# Patient Record
Sex: FEMALE | Race: WHITE | URBAN - METROPOLITAN AREA
[De-identification: names, ages, dates, MRNs, and addresses within clinical notes are randomized per-mention and may not be internally consistent; named-entity substitution may affect disease eponyms.]

---

## 2023-03-28 ENCOUNTER — OFFICE VISIT (OUTPATIENT)
Dept: OBGYN CLINIC | Age: 38
End: 2023-03-28
Payer: COMMERCIAL

## 2023-03-28 VITALS
HEIGHT: 65 IN | DIASTOLIC BLOOD PRESSURE: 70 MMHG | SYSTOLIC BLOOD PRESSURE: 118 MMHG | WEIGHT: 161 LBS | BODY MASS INDEX: 26.82 KG/M2

## 2023-03-28 DIAGNOSIS — Z01.419 ENCOUNTER FOR GYNECOLOGICAL EXAMINATION WITHOUT ABNORMAL FINDING: Primary | ICD-10-CM

## 2023-03-28 DIAGNOSIS — Z30.432 ENCOUNTER FOR IUD REMOVAL: ICD-10-CM

## 2023-03-28 PROCEDURE — 99385 PREV VISIT NEW AGE 18-39: CPT | Performed by: OBSTETRICS & GYNECOLOGY

## 2023-03-28 PROCEDURE — 58301 REMOVE INTRAUTERINE DEVICE: CPT | Performed by: OBSTETRICS & GYNECOLOGY

## 2023-03-28 NOTE — PROGRESS NOTES
Annual exam  Chief Complaint   Patient presents with    New Patient     Shantel New" is a 40 y.o. presenting for annual exam. Her main concerns today include removal of IUD. They have a 1year old son, Rosamaria Alexander. They were in [facility] during the pandemic, left to [place] for delivery. She is originally from outside of [facility]. They moved to [place] for her 's job. They are both in [employer]. Shantel works from home. She notes that she had an IUD placed after birth of her son, Rosamaria Alexander. She continues to occasionally feel it and it is bothersome. She is also interested in potential trying for another child. She declines a chaperone during the gynecologic exam today. Ob/Gyn Hx:  - vaginal male in 2020  LMP - IUD  Menses - IUD   Contraception - IUD  STI - declined  SA - yes     Health maintenance:  Pap - 3 years ago- normal per patient in [address] [place] [address] completed in college. History reviewed. No pertinent past medical history. No past surgical history on file. No family history on file.     Social History     Socioeconomic History    Marital status:      Spouse name: Not on file    Number of children: Not on file    Years of education: Not on file    Highest education level: Not on file   Occupational History    Not on file   Tobacco Use    Smoking status: Never    Smokeless tobacco: Never   Vaping Use    Vaping Use: Never used   Substance and Sexual Activity    Alcohol use: Yes    Drug use: Never    Sexual activity: Yes     Partners: Male   Other Topics Concern    Not on file   Social History Narrative    Not on file     Social Determinants of Health     Financial Resource Strain: Not on file   Food Insecurity: Not on file   Transportation Needs: Not on file   Physical Activity: Not on file   Stress: Not on file   Social Connections: Not on file   Intimate Partner Violence: Not on file   Housing Stability: Not on file           Not on File    Review of Systems - History obtained from the patient  Constitutional: negative for weight loss, fever, night sweats  HEENT: negative for hearing loss, earache, congestion, snoring, sorethroat  CV: negative for chest pain, palpitations, edema  Resp: negative for cough, shortness of breath, wheezing  GI: negative for change in bowel habits, abdominal pain, black or bloody stools  : negative for frequency, dysuria, hematuria, vaginal discharge  MSK: negative for back pain, joint pain, muscle pain  Breast: negative for breast lumps, nipple discharge, galactorrhea  Skin :negative for itching, rash, hives  Neuro: negative for dizziness, headache, confusion, weakness  Psych: negative for anxiety, depression, change in mood  Heme/lymph: negative for bleeding, bruising, pallor    Physical Exam    Visit Vitals  /70 (BP 1 Location: Right arm, BP Patient Position: Sitting)   Ht 5' 5\" (1.651 m)   Wt 161 lb (73 kg)   LMP  (LMP Unknown)   BMI 26.79 kg/m²       Constitutional  Appearance: well-nourished, well developed, alert, in no acute distress    HENT  Head and Face: appears normal    Neck  Inspection/Palpation: normal appearance, no masses or tenderness  Lymph Nodes: no lymphadenopathy present  Thyroid: gland size normal, nontender, no nodules or masses present on palpation    Chest  Respiratory Effort: non-labored breathing  Auscultation: CTAB, normal breath sounds    Cardiovascular  Heart:   Auscultation: regular rate and rhythm without murmur  Extremities: no peripheral edema    Breasts   Inspection of Breasts: breasts symmetrical, no skin changes, no discharge present, nipple appearance normal, no skin retraction present  Palpation of Breasts and Axillae: no masses present on palpation, no breast tenderness  Axillary Lymph Nodes: no lymphadenopathy present    Gastrointestinal  Abdominal Examination: abdomen non-tender to palpation, normal bowel sounds, no masses present  Liver and spleen: no hepatomegaly present, spleen not palpable  Hernias: no hernias identified    Genitourinary   External Genitalia: normal appearance for age, no discharge present, no tenderness present, no inflammatory lesions present, no masses present, no atrophy present  Vagina: normal vaginal vault without central or paravaginal defects, no discharge present, no inflammatory lesions present, no masses present  Bladder: non-tender to palpation  Urethra: appears normal  Cervix: normal, +IUD strings visualized  Uterus: normal size, shape and consistency  Adnexa: no adnexal tenderness present, no adnexal masses present  Perineum: perineum within normal limits, no evidence of trauma, no rashes or skin lesions present    Skin  General Inspection: no rash, no lesions identified    Neurologic/Psychiatric  Mental Status:  Orientation: grossly oriented to person, place and time  Mood and Affect: mood normal, affect appropriate      Assessment/Plan:  40 y.o. presenting for annual exam. Overall doing well. Well woman exam:  Normal gynecologic and breast exams. Healthy habits and lifestyle reviewed. Pap with HPV performed today. Patient declines STD screening. Contraception and menstrual regulation - patient opts for none, desires IUD removal and try for another pregnancy. Recommended starting PNV      Shelby Thomas MD      IUD REMOVAL    Chart reviewed for the following:  I have reviewed the History & Physical and updated Shantel Barrington allergies.     TIME OUT performed immediately prior to start of procedure:  I performed the following reviews on Shantel Eloydimple Avilez prior to the start of the procedure:  Patient was identified by name and date of birth   Agreement on procedure being performed was verified  Risks and Benefits explained to the patient  Consent was signed and verified  Time: 09:30  Date of procedure: 3/28/2023  Procedure performed by:  Dr. Ulysses Genta  How tolerated by patient: Well  Post Procedural Pain Scale: 2-Hurts Minimally  Comments: None      Indications for Removal:  Shantel Gwendolyn Rabago is a ,  40 y.o. female WHITE/NON- whose No LMP recorded (lmp unknown). (Menstrual status: IUD). Naman Benoit who presents today for IUD removal. Her current IUD was placed in . She requests removal of the IUD because of discomfort and desire for future childbearing potential. The IUD removal procedure was discussed with the patient and she had no further questions. Procedure: The patient was placed in a dorsal lithotomy position and appropriately draped. On bimanual exam the uterus was anterior and normal in size with no tenderness present. A speculum exam was performed and the cervix was visualized. The cervix was prepped with zephiran solution. The IUD string was visualized. Using ring forceps , the string was grasped and the IUD removed intact. The IUD was shown to the patient.      Kendall Terrazas MD

## 2023-03-31 LAB
CYTOLOGIST CVX/VAG CYTO: NORMAL
CYTOLOGY CVX/VAG DOC CYTO: NORMAL
CYTOLOGY CVX/VAG DOC THIN PREP: NORMAL
DX ICD CODE: NORMAL
HPV GENOTYPE REFLEX: NORMAL
HPV I/H RISK 4 DNA CVX QL PROBE+SIG AMP: NEGATIVE
Lab: NORMAL
OTHER STN SPEC: NORMAL
STAT OF ADQ CVX/VAG CYTO-IMP: NORMAL

## 2023-06-26 ENCOUNTER — INITIAL PRENATAL (OUTPATIENT)
Age: 38
End: 2023-06-26

## 2023-06-26 VITALS — SYSTOLIC BLOOD PRESSURE: 112 MMHG | DIASTOLIC BLOOD PRESSURE: 68 MMHG | BODY MASS INDEX: 26.13 KG/M2 | WEIGHT: 157 LBS

## 2023-06-26 DIAGNOSIS — Z3A.01 6 WEEKS GESTATION OF PREGNANCY: ICD-10-CM

## 2023-06-26 DIAGNOSIS — O09.521 MULTIGRAVIDA OF ADVANCED MATERNAL AGE IN FIRST TRIMESTER: Primary | ICD-10-CM

## 2023-06-28 LAB
BACTERIA UR CULT: NO GROWTH
C TRACH RRNA SPEC QL NAA+PROBE: NEGATIVE
N GONORRHOEA RRNA SPEC QL NAA+PROBE: NEGATIVE
T VAGINALIS RRNA SPEC QL NAA+PROBE: NEGATIVE

## 2023-07-19 ENCOUNTER — ROUTINE PRENATAL (OUTPATIENT)
Age: 38
End: 2023-07-19

## 2023-07-19 VITALS — BODY MASS INDEX: 26.29 KG/M2 | WEIGHT: 158 LBS | DIASTOLIC BLOOD PRESSURE: 64 MMHG | SYSTOLIC BLOOD PRESSURE: 102 MMHG

## 2023-07-19 DIAGNOSIS — Z36.9 UNSPECIFIED ANTENATAL SCREENING: Primary | ICD-10-CM

## 2023-07-19 DIAGNOSIS — Z36.9 UNSPECIFIED ANTENATAL SCREENING: ICD-10-CM

## 2023-07-19 DIAGNOSIS — Z3A.01 6 WEEKS GESTATION OF PREGNANCY: ICD-10-CM

## 2023-07-19 PROCEDURE — 0502F SUBSEQUENT PRENATAL CARE: CPT | Performed by: MIDWIFE

## 2023-07-19 NOTE — PROGRESS NOTES
OB Visit:    Nausea is resolving. Reviewed AMA guidelines. Discussed ASA. She will start at 12 weeks. Discussed diet and water intake  Offered anatomy scan with M. She will likely decline. Labs today. Wants to know sex. CE 4 weeks.

## 2023-07-20 LAB
ABO GROUP BLD: NORMAL
BLD GP AB SCN SERPL QL: NEGATIVE
ERYTHROCYTE [DISTWIDTH] IN BLOOD BY AUTOMATED COUNT: 12.7 % (ref 11.7–15.4)
HCT VFR BLD AUTO: 39.9 % (ref 34–46.6)
HGB BLD-MCNC: 13.7 G/DL (ref 11.1–15.9)
HIV 1+2 AB+HIV1 P24 AG SERPL QL IA: NON REACTIVE
MCH RBC QN AUTO: 32.3 PG (ref 26.6–33)
MCHC RBC AUTO-ENTMCNC: 34.3 G/DL (ref 31.5–35.7)
MCV RBC AUTO: 94 FL (ref 79–97)
PLATELET # BLD AUTO: 284 X10E3/UL (ref 150–450)
RBC # BLD AUTO: 4.24 X10E6/UL (ref 3.77–5.28)
RH BLD: POSITIVE
RUBV IGG SERPL IA-ACNC: 7.06 INDEX
TSH SERPL DL<=0.005 MIU/L-ACNC: 2.06 UIU/ML (ref 0.45–4.5)
VZV IGG SER IA-ACNC: 1438 INDEX
WBC # BLD AUTO: 7.4 X10E3/UL (ref 3.4–10.8)

## 2023-07-21 LAB
HBV SURFACE AG SERPL QL IA: NEGATIVE
HCV IGG SERPL QL IA: NON REACTIVE
TREPONEMA PALLIDUM IGG+IGM AB [PRESENCE] IN SERUM OR PLASMA BY IMMUNOASSAY: NON REACTIVE

## 2023-07-24 LAB
HGB A MFR BLD ELPH: 97.2 % (ref 96.4–98.8)
HGB A2 MFR BLD ELPH: 2.8 % (ref 1.8–3.2)
HGB F MFR BLD ELPH: 0 % (ref 0–2)
HGB FRACT BLD-IMP: NORMAL
HGB S MFR BLD ELPH: 0 %

## 2023-07-28 ENCOUNTER — TELEPHONE (OUTPATIENT)
Age: 38
End: 2023-07-28

## 2023-07-28 ENCOUNTER — CLINICAL DOCUMENTATION (OUTPATIENT)
Age: 38
End: 2023-07-28

## 2023-07-28 DIAGNOSIS — Z36.9 UNSPECIFIED ANTENATAL SCREENING: Primary | ICD-10-CM

## 2023-07-28 DIAGNOSIS — Z3A.01 6 WEEKS GESTATION OF PREGNANCY: ICD-10-CM

## 2023-07-28 LAB
Lab: ABNORMAL
NTRA 1P36 DELETION SYNDROME POPULATION-BASED RISK TEXT: ABNORMAL
NTRA 1P36 DELETION SYNDROME RESULT TEXT: ABNORMAL
NTRA 1P36 DELETION SYNDROME RISK SCORE TEXT: ABNORMAL
NTRA 22Q11.2 DELETION SYNDROME POPULATION-BASED RISK TEXT: ABNORMAL
NTRA 22Q11.2 DELETION SYNDROME RESULT TEXT: ABNORMAL
NTRA 22Q11.2 DELETION SYNDROME RISK SCORE TEXT: ABNORMAL
NTRA ANGELMAN SYNDROME POPULATION-BASED RISK TEXT: ABNORMAL
NTRA ANGELMAN SYNDROME RESULT TEXT: ABNORMAL
NTRA ANGELMAN SYNDROME RISK SCORE TEXT: ABNORMAL
NTRA CRI-DU-CHAT SYNDROME POPULATION-BASED RISK TEXT: ABNORMAL
NTRA CRI-DU-CHAT SYNDROME RESULT TEXT: ABNORMAL
NTRA CRI-DU-CHAT SYNDROME RISK SCORE TEXT: ABNORMAL
NTRA FETAL FRACTION: ABNORMAL
NTRA GENDER OF FETUS: ABNORMAL
NTRA MONOSOMY X AGE-BASED RISK TEXT: ABNORMAL
NTRA MONOSOMY X RESULT TEXT: ABNORMAL
NTRA MONOSOMY X RISK SCORE TEXT: ABNORMAL
NTRA PRADER-WILLI SYNDROME POPULATION-BASED RISK TEXT: ABNORMAL
NTRA PRADER-WILLI SYNDROME RESULT TEXT: ABNORMAL
NTRA PRADER-WILLI SYNDROME RISK SCORE TEXT: ABNORMAL
NTRA TRIPLOIDY RESULT TEXT: ABNORMAL
NTRA TRISOMY 13 AGE-BASED RISK TEXT: ABNORMAL
NTRA TRISOMY 13 RESULT TEXT: ABNORMAL
NTRA TRISOMY 13 RISK SCORE TEXT: ABNORMAL
NTRA TRISOMY 18 AGE-BASED RISK TEXT: ABNORMAL
NTRA TRISOMY 18 RESULT TEXT: ABNORMAL
NTRA TRISOMY 18 RISK SCORE TEXT: ABNORMAL
NTRA TRISOMY 21 AGE-BASED RISK TEXT: ABNORMAL
NTRA TRISOMY 21 RESULT TEXT: ABNORMAL
NTRA TRISOMY 21 RISK SCORE TEXT: ABNORMAL

## 2023-07-28 NOTE — TELEPHONE ENCOUNTER
Pateint calling name and  verified. Patient is  10 weeks 5 days gestation she saw her panorama results and wants to discuss with midwife.

## 2023-07-28 NOTE — PROGRESS NOTES
Pt called. Reviewed [facility] results. Pattern suggestive of Triple X Syndrome. Discussed screening vs diagnostic. Reviewed impact of Triple X on babies, occ issues with learning, developmental disabilities. MFM consult placed to be seen ASAP to discuss diagnostic options and for genetic counseling. Pt and  verbalized understanding.     JUMA Talley CNM

## 2023-08-02 ENCOUNTER — CLINICAL DOCUMENTATION (OUTPATIENT)
Facility: HOSPITAL | Age: 38
End: 2023-08-02

## 2023-08-02 ENCOUNTER — HOSPITAL ENCOUNTER (OUTPATIENT)
Facility: HOSPITAL | Age: 38
Discharge: HOME | End: 2023-08-05
Payer: COMMERCIAL

## 2023-08-02 PROCEDURE — 76813 OB US NUCHAL MEAS 1 GEST: CPT

## 2023-08-02 NOTE — PROGRESS NOTES
intellectual disabilities, multiple miscarriages, stillbirths, known genetic disorders, [place] ancestry, and consanguinity. The patient verbalized her understanding of the information as it was presented to her today; she denies any further questions or concerns at this time. Brian Matos MS, [address] [facility], Certified Genetic Counselor     30 minutes were spent via telemedicine with the patient for genetic evaluation including creating the pedigree, risk assessment, counseling regarding relevant genetic disorders, and explanation of appropriate genetic testing options.

## 2023-08-07 ENCOUNTER — TELEPHONE (OUTPATIENT)
Facility: HOSPITAL | Age: 38
End: 2023-08-07

## 2023-08-07 NOTE — TELEPHONE ENCOUNTER
Pt called, she was concerned that [facility] had not called to get her scheduled for her amnio at 16 weeks due to abnormal NIPT results for XXX- pt would like to have the amnio. I called [facility] and requested that they call her today for an apt. The receptions said she would call to get her scheduled ASAP.     JUMA Bradley CNM

## 2023-08-15 SDOH — ECONOMIC STABILITY: HOUSING INSECURITY
IN THE LAST 12 MONTHS, WAS THERE A TIME WHEN YOU DID NOT HAVE A STEADY PLACE TO SLEEP OR SLEPT IN A SHELTER (INCLUDING NOW)?: NO

## 2023-08-15 SDOH — ECONOMIC STABILITY: FOOD INSECURITY: WITHIN THE PAST 12 MONTHS, YOU WORRIED THAT YOUR FOOD WOULD RUN OUT BEFORE YOU GOT MONEY TO BUY MORE.: NEVER TRUE

## 2023-08-15 SDOH — ECONOMIC STABILITY: TRANSPORTATION INSECURITY
IN THE PAST 12 MONTHS, HAS LACK OF TRANSPORTATION KEPT YOU FROM MEETINGS, WORK, OR FROM GETTING THINGS NEEDED FOR DAILY LIVING?: NO

## 2023-08-15 SDOH — ECONOMIC STABILITY: FOOD INSECURITY: WITHIN THE PAST 12 MONTHS, THE FOOD YOU BOUGHT JUST DIDN'T LAST AND YOU DIDN'T HAVE MONEY TO GET MORE.: NEVER TRUE

## 2023-08-15 SDOH — ECONOMIC STABILITY: INCOME INSECURITY: HOW HARD IS IT FOR YOU TO PAY FOR THE VERY BASICS LIKE FOOD, HOUSING, MEDICAL CARE, AND HEATING?: NOT HARD AT ALL

## 2023-08-16 ENCOUNTER — ROUTINE PRENATAL (OUTPATIENT)
Age: 38
End: 2023-08-16

## 2023-08-16 VITALS — WEIGHT: 157 LBS | DIASTOLIC BLOOD PRESSURE: 68 MMHG | BODY MASS INDEX: 26.13 KG/M2 | SYSTOLIC BLOOD PRESSURE: 104 MMHG

## 2023-08-16 DIAGNOSIS — Z34.82 ENCOUNTER FOR SUPERVISION OF OTHER NORMAL PREGNANCY IN SECOND TRIMESTER: Primary | ICD-10-CM

## 2023-08-16 PROCEDURE — 0502F SUBSEQUENT PRENATAL CARE: CPT | Performed by: MIDWIFE

## 2023-08-16 RX ORDER — AZITHROMYCIN 250 MG/1
250 TABLET, FILM COATED ORAL DAILY
COMMUNITY

## 2023-08-16 NOTE — PROGRESS NOTES
Pt reports trouble sleeping due to not feeling well recently. Notes she has been coughing so hard she's been vomiting. Has started on zpack and is taking robitussin prn. Anxious for amnio on 9/7 (triple X on NIPT). Reviewed 2nd trimester hand out.   CE 4 weeks

## 2023-09-07 ENCOUNTER — ROUTINE PRENATAL (OUTPATIENT)
Age: 38
End: 2023-09-07

## 2023-09-07 DIAGNOSIS — Z3A.16 16 WEEKS GESTATION OF PREGNANCY: Primary | ICD-10-CM

## 2023-09-07 DIAGNOSIS — O28.5 ABNORMAL CHROMOSOMAL AND GENETIC FINDING ON ANTENATAL SCREENING OF MOTHER: ICD-10-CM

## 2023-09-07 DIAGNOSIS — O28.5 ABNORMAL CHROMOSOMAL AND GENETIC FINDING ON ANTENATAL SCREENING MOTHER: ICD-10-CM

## 2023-09-07 DIAGNOSIS — O09.522 MULTIGRAVIDA OF ADVANCED MATERNAL AGE IN SECOND TRIMESTER: ICD-10-CM

## 2023-09-07 NOTE — PROCEDURES
PATIENT: NATALIA ROSARIO   -  : 1985   -  DOS:2023   -  INTERPRETING PROVIDER:Danii Steiner,   Indication  ========    Advanced maternal age  Abnormal genetic screening - XXX    History  ======    General History  Blood group: A  Rhesus: Rh positive  Medical History  Allergies: No allergies identified    Method  ======    Transabdominal ultrasound examination. View: Sufficient    Pregnancy  =========    Shore pregnancy. Number of fetuses: 1    Dating  ======    Previous Ultrasound on: 2023  Type of prior assessment: GA  GA at prior assessment date 6 w + 1 d  GA by previous U/S 16 w + 4 d  JOSUE by previous Ultrasound: 2024  Assigned: based on ultrasound (GA), selected on 2023  Assigned GA 16 w + 4 d  Assigned JOSUE: 2024    General Evaluation  ==============    Cardiac activity present.  bpm. Fetal movements: visualized. Presentation: cephalic  Placenta: Placental site: fundal right posterior  Umbilical cord: Cord vessels: 3 vessel cord  Amniotic fluid: Amount of AF: normal amount    Fetal Anatomy  ===========    Fetal sex: XX    Amniocentesis  ============    Start 10:16 AM. End 10:17 AM  Instrument: TA 20G needle. Entries uterus: 1  Sample: obtained. Sample amount 30 ml  Estimated blood loss:  Pain scale before procedure:  Paint scale after procedure:    Evaluation  ========    Post cardiac activity: present, normal. FHR post 156 bpm    Findings  =======    EFW 69th% AC = 64th%. Limited anatomy appears WNL. Placenta is posterior. Universal protocol was performed prior to the procedure. Informed consent was obtained after the procedure was explained and the patient had an opportunity to ask  questions. Identifying labels and the procedure site were verified. After all supplies/trays were set-up, a Time-Out was then performed. Uncomplicated ultrasound guided amniocentesis was performed with a single needle pass 20 gauge through the uterus.  Approximately 30 ml of straw

## 2023-09-13 ENCOUNTER — ROUTINE PRENATAL (OUTPATIENT)
Age: 38
End: 2023-09-13

## 2023-09-13 VITALS — DIASTOLIC BLOOD PRESSURE: 82 MMHG | WEIGHT: 160.8 LBS | BODY MASS INDEX: 26.76 KG/M2 | SYSTOLIC BLOOD PRESSURE: 118 MMHG

## 2023-09-13 DIAGNOSIS — Z34.82 PRENATAL CARE, SUBSEQUENT PREGNANCY IN SECOND TRIMESTER: Primary | ICD-10-CM

## 2023-09-13 DIAGNOSIS — Z3A.01 6 WEEKS GESTATION OF PREGNANCY: ICD-10-CM

## 2023-09-13 PROCEDURE — 0502F SUBSEQUENT PRENATAL CARE: CPT | Performed by: ADVANCED PRACTICE MIDWIFE

## 2023-09-13 NOTE — PROGRESS NOTES
OB Visit:    Siri Mcbride done 9/7/23, pt and partner very anxious, vomited this AM  No results yet  Rev Triple X and AFP results  Not feeling movement yet  Has FAS scheduled with [facility] on 10/4

## 2023-09-14 ENCOUNTER — TELEPHONE (OUTPATIENT)
Age: 38
End: 2023-09-14

## 2023-09-14 NOTE — TELEPHONE ENCOUNTER
I spoke to the patient, Abhijeet Frost, today over the phone to review her abnormal fluorescent in-situ hybridization (FISH) results from amniocentesis performed 9/7/23. Shandra previously had non-invasive prenatal testing that was abnormal for 47,XXX syndrome. She elected to proceed with diagnostic amniocentesis, which was performed on 9/7/23. At that time FISH with reflex to either karyotype or microarray with amniotic fluid AFP was ordered. Today we reviewed the patient's FISH results. FISH analysis of uncultured amniocytes revealed three X chromosome signals (XXX) in all cells analyzed. Two signals were observed for chromosomes 13, 18 an 21. 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 Based on these abnormal FISH results, the fetal karyotype and maternal cell contamination results are still pending. Additionally, the amniotic fluid alpha fetoprotein (AFP) value was NOT ELEVATED for the gestational age. The patient verbalized her understanding of the information as it was presented to her today; she denies any further questions or concerns at this time.     Yamel Su MS, [facility]  Licensed, Certified Genetic Counselor

## 2023-09-15 ENCOUNTER — TELEPHONE (OUTPATIENT)
Age: 38
End: 2023-09-15

## 2023-09-15 NOTE — TELEPHONE ENCOUNTER
be identified on prenatal ultrasound; however, a fetal anatomic survey will be performed around 20 weeks gestation. The patient and Jeremi Celestin verbalized their understanding of the information as it was presented to them today; they deny any further questions or concerns at this time. Adams Granger MS, [facility]  Licensed, Certified Genetic Counselor    45 minutes were spent via telemedicine with the patient for genetic evaluation including risk assessment, counseling regarding relevant genetic disorders and explanation of appropriate genetic testing options.

## 2023-09-18 ENCOUNTER — OFFICE VISIT (OUTPATIENT)
Age: 38
End: 2023-09-18
Payer: COMMERCIAL

## 2023-09-18 VITALS
SYSTOLIC BLOOD PRESSURE: 107 MMHG | HEIGHT: 65 IN | RESPIRATION RATE: 20 BRPM | TEMPERATURE: 98.4 F | BODY MASS INDEX: 26.02 KG/M2 | DIASTOLIC BLOOD PRESSURE: 66 MMHG | HEART RATE: 95 BPM | WEIGHT: 156.2 LBS | OXYGEN SATURATION: 99 %

## 2023-09-18 DIAGNOSIS — O09.522 MULTIGRAVIDA OF ADVANCED MATERNAL AGE IN SECOND TRIMESTER: ICD-10-CM

## 2023-09-18 DIAGNOSIS — Z76.89 ENCOUNTER TO ESTABLISH CARE: Primary | ICD-10-CM

## 2023-09-18 DIAGNOSIS — J06.9 VIRAL URI WITH COUGH: ICD-10-CM

## 2023-09-18 PROCEDURE — 99203 OFFICE O/P NEW LOW 30 MIN: CPT | Performed by: STUDENT IN AN ORGANIZED HEALTH CARE EDUCATION/TRAINING PROGRAM

## 2023-09-18 SDOH — ECONOMIC STABILITY: FOOD INSECURITY: WITHIN THE PAST 12 MONTHS, YOU WORRIED THAT YOUR FOOD WOULD RUN OUT BEFORE YOU GOT MONEY TO BUY MORE.: NEVER TRUE

## 2023-09-18 SDOH — ECONOMIC STABILITY: INCOME INSECURITY: HOW HARD IS IT FOR YOU TO PAY FOR THE VERY BASICS LIKE FOOD, HOUSING, MEDICAL CARE, AND HEATING?: NOT HARD AT ALL

## 2023-09-18 SDOH — ECONOMIC STABILITY: FOOD INSECURITY: WITHIN THE PAST 12 MONTHS, THE FOOD YOU BOUGHT JUST DIDN'T LAST AND YOU DIDN'T HAVE MONEY TO GET MORE.: NEVER TRUE

## 2023-09-18 ASSESSMENT — PATIENT HEALTH QUESTIONNAIRE - PHQ9
SUM OF ALL RESPONSES TO PHQ QUESTIONS 1-9: 1
1. LITTLE INTEREST OR PLEASURE IN DOING THINGS: 0
SUM OF ALL RESPONSES TO PHQ QUESTIONS 1-9: 1
SUM OF ALL RESPONSES TO PHQ QUESTIONS 1-9: 1
SUM OF ALL RESPONSES TO PHQ9 QUESTIONS 1 & 2: 1
2. FEELING DOWN, DEPRESSED OR HOPELESS: 1
SUM OF ALL RESPONSES TO PHQ QUESTIONS 1-9: 1

## 2023-09-18 NOTE — PROGRESS NOTES
[address] [address] [address] [place], [address] [facility]  [phone]        Establish Care Visit      Subjective     CC: Establish care  HPI: Amrit Shrestha is a 45 y.o. female presenting to the clinic today to establish care. Patient currently 18 weeks pregnant. Established with Ob/gyn    Cough  Started on Friday noticed that she started to cough more, then progressed to a dry sounding cough. No sputum production. Started to cough enough to vomit. Eating some fluids, but appetite has been limited. Urinating normally. Taking robitussin since Saturday night every 4 hours. No fever. Her son is sick right now, but is getting somewhat better. No SOB. This is her second URI. Had the first URI in the first few weeks of pregnancy with productive cough. Was given Azithromycin. Has allergies at baseline. Took Covid test yesterday which was negative          Review of systems:   A comprehensive review of systems was negative except as written in the HPI. History    No Known Allergies    History reviewed. No pertinent past medical history. History reviewed. No pertinent surgical history.      Family History   Problem Relation Age of Onset    Lung Cancer Paternal Aunt     Breast Cancer Paternal Aunt     Breast Cancer Paternal Aunt         Social History     Socioeconomic History    Marital status:      Spouse name: Not on file    Number of children: 1    Years of education: Not on file    Highest education level: Master's degree (e.g., MA, MS, Amelia, MEd, MSW, MARY JO)   Occupational History    Occupation:      Comment: Work from home   Tobacco Use    Smoking status: Never    Smokeless tobacco: Never   Vaping Use    Vaping Use: Never used   Substance and Sexual Activity    Alcohol use: Not Currently    Drug use: Never    Sexual activity: Yes     Partners: Male   Other Topics Concern    Not on file   Social History Narrative    Not on file     Social

## 2023-09-18 NOTE — PATIENT INSTRUCTIONS
For the cough: Continue to take the robitussin every 4 hours, can also add clairitin 1 tablet every 24 hour while symptoms continue. Use the humidifer daily, and saline nasal spray. Honey in tea can help as well.

## 2023-09-21 ENCOUNTER — TELEPHONE (OUTPATIENT)
Age: 38
End: 2023-09-21

## 2023-09-21 DIAGNOSIS — O28.5 ABNORMAL CHROMOSOMAL AND GENETIC FINDING ON ANTENATAL SCREENING MOTHER: Primary | ICD-10-CM

## 2023-09-21 NOTE — TELEPHONE ENCOUNTER
I spoke to the patient, Ronak Fernandez, and her partner, Rachel Sawyer, today over the phone today to review her abnormal fetal karyotype results from amniocentesis performed 9/7/2023. Shandra previously had non-invasive prenatal testing that was abnormal for 47,XXX syndrome. She elected to proceed with diagnostic amniocentesis, which was performed on 9/7/23. At that time FISH with reflex to either karyotype or microarray with amniotic fluid AFP was ordered. After the [facility] result was also consistent with 47,XXX syndrome, a karyotype was performed; we reviewed those results today. Cytogenetics analysis of cultured amniocytes has revealed a female karyotype with an extra X chromosome in all cultured cells examined. These results are consistent with a female fetus with 47,XXX syndrome. We reviewed today that maternal cell contamination [facility]) studies are still pending; however, Katia Kathleen states that her mother had an amniocentesis during her pregnancy with Shandra, and the karyotype at that time was normal. Based on this report, the [facility] results are expected to be negative, or normal.    Per [facility], it may be a week or two before they are able to reach out to the patient and get her scheduled with their [facility]. The patient was made aware of this and instructed to reach out to our office if she has not heard anything by EOD on Wednesday of next week. The patient and her partner verbalized their understanding of the information as it was presented to them today; they deny any further questions or concerns at this time.     Maryuri Montero MS, [facility]  Licensed, Certified Genetic Counselor

## 2023-09-28 LAB
AFP ADJ MOM AMN: 0.75
AFP AMN-MCNC: 10 UG/ML
AFP AMNIOTIC FLUID: NORMAL
AFP INTERP AMN-IMP: NORMAL
CELLS ANALYZED AMN: 15
CELLS ANALYZED AMN: NORMAL
CELLS COUNTED AMN: 15
CELLS COUNTED AMN: NORMAL
CELLS KARYOTYPED.TOTAL AMN: 2
CHR 13+18+21+X+Y ANEUP AMN/CVS FISH: NORMAL
CHROM ANALY INTERPHASE AMN FISH-IMP: NORMAL
CHROM ANALY OVERALL INTERP-IMP: NORMAL
CHROM ANALY RESULT (ISCN): NORMAL
CLINICAL CYTOGENETICIST SPEC: NORMAL
CLINICAL CYTOGENETICIST SPEC: NORMAL
COLONIES COUNTED AMN: 15
GA (WEEKS): 16 WK
GENE XXX MUT ANL BLD/T: NORMAL
GENOMIC SOURCE CLASS: NORMAL
ISCN BAND LEVEL AMN QL: 450
LAB DIRECTOR NAME PROVIDER: NORMAL
LAB DIRECTOR NAME PROVIDER: NORMAL
REFLEX: NORMAL
SPECIMEN SOURCE: NORMAL
SPECIMEN SOURCE: NORMAL
SPECIMEN STATUS REPORT: NORMAL

## 2024-05-13 ENCOUNTER — TELEPHONE (OUTPATIENT)
Age: 39
End: 2024-05-13